# Patient Record
Sex: FEMALE | Race: WHITE | Employment: OTHER | URBAN - METROPOLITAN AREA
[De-identification: names, ages, dates, MRNs, and addresses within clinical notes are randomized per-mention and may not be internally consistent; named-entity substitution may affect disease eponyms.]

---

## 2018-03-26 PROBLEM — N89.8 VAGINAL WALL CYST: Status: ACTIVE | Noted: 2018-03-26

## 2019-04-03 PROBLEM — E66.01 SEVERE OBESITY (HCC): Status: ACTIVE | Noted: 2019-04-03

## 2019-11-14 PROBLEM — F33.0 DEPRESSION, MAJOR, RECURRENT, MILD (HCC): Status: ACTIVE | Noted: 2019-11-14

## 2021-04-27 ENCOUNTER — OFFICE VISIT (OUTPATIENT)
Dept: FAMILY MEDICINE CLINIC | Age: 41
End: 2021-04-27
Payer: MEDICAID

## 2021-04-27 VITALS
BODY MASS INDEX: 36.86 KG/M2 | TEMPERATURE: 99 F | OXYGEN SATURATION: 99 % | HEIGHT: 63 IN | DIASTOLIC BLOOD PRESSURE: 66 MMHG | RESPIRATION RATE: 18 BRPM | HEART RATE: 66 BPM | WEIGHT: 208 LBS | SYSTOLIC BLOOD PRESSURE: 120 MMHG

## 2021-04-27 DIAGNOSIS — Z11.59 NEED FOR HEPATITIS C SCREENING TEST: ICD-10-CM

## 2021-04-27 DIAGNOSIS — F41.9 ANXIETY AND DEPRESSION: Primary | ICD-10-CM

## 2021-04-27 DIAGNOSIS — E55.9 VITAMIN D DEFICIENCY: ICD-10-CM

## 2021-04-27 DIAGNOSIS — F32.A ANXIETY AND DEPRESSION: Primary | ICD-10-CM

## 2021-04-27 PROCEDURE — 99213 OFFICE O/P EST LOW 20 MIN: CPT | Performed by: PHYSICIAN ASSISTANT

## 2021-04-27 RX ORDER — SERTRALINE HYDROCHLORIDE 100 MG/1
100 TABLET, FILM COATED ORAL DAILY
COMMUNITY
End: 2022-05-03

## 2021-04-27 RX ORDER — DEXTROAMPHETAMINE SACCHARATE, AMPHETAMINE ASPARTATE, DEXTROAMPHETAMINE SULFATE AND AMPHETAMINE SULFATE 5; 5; 5; 5 MG/1; MG/1; MG/1; MG/1
20 TABLET ORAL 2 TIMES DAILY
COMMUNITY
End: 2022-05-03

## 2021-04-27 NOTE — ACP (ADVANCE CARE PLANNING)
Non-Provider Advance Care Planning (ACP) Note    Date of ACP Conversation: 4/27/2021  Persons included in Conversation: patient  Length of ACP Conversation in minutes: <16 minutes (Non-Billable)    Conversation requested by:   Provider    Authorized Decision Maker (if patient is incapable of making informed decisions):    This person is:  Named in Advance Directive or [address] [facility] [address] for ALL Patients with Decision Making Capacity:    Advance Directive Conversation with Patients who have not yet planned:  Importance of advance care planning, including choosing a healthcare agent to communicate patient's healthcare decisions if patient lost the ability to make decisions, such as after a sudden illness or accident    Review of Existing Advance Directive: (Select questions covered)  gave patient ACP paperwork    Interventions Provided:  gave patient ACP paperwork

## 2021-04-27 NOTE — PROGRESS NOTES
Sofia Knutson is a 41 y.o. female who presents to the office today with the following:  Chief Complaint   Patient presents with    Follow-up     with [facility]    Anxiety    Depression       HPI  Pt is now being treated by psychiatry. Is going to Dr. Maria Fernanda Zaman at [address] [place] [address] in [address] [place] [address]. Pt reports she has been doing well managing her anxiety/depression/ADHD. Did test positive for ADHD and is doing well on Adderall (though had to go to BID rather than one extended release daily for her symptoms). Also was put back on sertraline, was on in the past w/o much relief, but psychiatrist feels this combination may be helfpul (wherease she was not on the Adderall w/ it previously)  She does not report any concerning symptoms at this time. Is due for her Vit D recheck as well. Lab Results   Component Value Date/Time    VITAMIN D, 25-HYDROXY 23.5 (L) 01/15/2021 08:41 AM    has been taking 50,000IU weekly since last lab. Also unsure of Hep C screening, aside from when pregnant. No known hx. Agrees to lab. Otherwise feeling well with no other complaints or acute concerns. Plans to schedule her pap smear soon with her GYN. ROS  See HPI. Past Medical History:   Diagnosis Date    Abnormal Papanicolaou smear of cervix     ADD (attention deficit disorder)     Depression     Headache     MIGRAINES       Past Surgical History:   Procedure Laterality Date    HX ORTHOPAEDIC  2009, 2010, 2012, 2014    SHOULDER SURGERY X 4    HX TUBAL LIGATION  12/01/2016       Allergies   Allergen Reactions    Sulfa (Sulfonamide Antibiotics) Anaphylaxis    Clindamycin Rash     Pt was also on Medrol, but has had that before w/o issues. She notes was given other drugs in ER visit 1 week prior to rash, but had been on the Clindamycin all week leading up to it.     Other Medication Other (comments)     Pt has tried Lexapro, Sertraline, Effexor, Paxil, and Wellbutrin (anger/irritability) and either had unwanted SEs or did not work for anxiety/depression       Current Outpatient Medications   Medication Sig    sertraline (ZOLOFT) 100 mg tablet Take 100 mg by mouth daily.  dextroamphetamine-amphetamine (AdderalL) 20 mg tablet Take 20 mg by mouth two (2) times a day.  ergocalciferol (ERGOCALCIFEROL) 1,250 mcg (50,000 unit) capsule Take 1 Cap by mouth every seven (7) days.  ondansetron (ZOFRAN ODT) 4 mg disintegrating tablet DISSOLVE 1 TABLET ON TONGUE EVERY 8 HOURS AS NEEDED FOR NAUSEA    ibuprofen (MOTRIN) 800 mg tablet Take 1 Tab by mouth every eight (8) hours as needed for Pain.  butalbital-acetaminophen-caffeine (FIORICET, ESGIC) -40 mg per tablet TAKE 1 TABLET BY MOUTH EVERY 6 HOURS AS NEEDED FOR MIGRAINE    acetaminophen (Tylenol Extra Strength) 500 mg tablet Take 500 mg by mouth every six (6) hours as needed for Pain.  omeprazole (PRILOSEC) 40 mg capsule Take 40 mg by mouth daily.  montelukast (SINGULAIR) 10 mg tablet Take 10 mg by mouth daily.  cetirizine (ZYRTEC) 10 mg tablet Take  by mouth daily as needed for Allergies.  fluticasone propionate (FLONASE ALLERGY RELIEF) 50 mcg/actuation nasal spray 2 Sprays by Both Nostrils route two (2) times daily as needed for Rhinitis.  SUMAtriptan (IMITREX) 50 mg tablet May take up to 100 mg x 1 dose as needed for headache. If symptoms persist may repeat one time 2 hours later. Max 200 mg daily. No current facility-administered medications for this visit. Social History     Socioeconomic History    Marital status:      Spouse name: Not on file    Number of children: Not on file    Years of education: Not on file    Highest education level: Not on file   Tobacco Use    Smoking status: Former Smoker     Types: Cigarettes     Quit date: 02/2018     Years since quitting: 3.2    Smokeless tobacco: Never Used   Substance and Sexual Activity    Alcohol use:  Yes     Alcohol/week: 4.0 standard drinks     Types: 4 Standard drinks or equivalent per week     Comment: not really    Drug use: No    Sexual activity: Yes     Partners: Male     Birth control/protection: Surgical     Comment: BTL       Family History   Adopted: Yes         Physical Exam:  Visit Vitals  /66 (BP 1 Location: Left upper arm, BP Patient Position: Sitting, BP Cuff Size: Adult)   Pulse 66   Temp 99 °F (37.2 °C) (Oral)   Resp 18   Ht 5' 3\" (1.6 m)   Wt 208 lb (94.3 kg)   SpO2 99%   BMI 36.85 kg/m²     Physical Exam  Vitals signs and nursing note reviewed. Constitutional:       Appearance: Normal appearance. HENT:      Head: Normocephalic and atraumatic. Neck:      Musculoskeletal: Neck supple. Cardiovascular:      Rate and Rhythm: Normal rate and regular rhythm. Pulses: Normal pulses. Heart sounds: Normal heart sounds. Pulmonary:      Effort: Pulmonary effort is normal.      Breath sounds: Normal breath sounds. Musculoskeletal:         General: No swelling. Skin:     General: Skin is warm and dry. Neurological:      Mental Status: She is alert and oriented to person, place, and time. Psychiatric:         Mood and Affect: Mood normal.         Assessment/Plan:    ICD-10-CM ICD-9-CM    1. Anxiety and depression  F41.9 300.00     F32.9 311    2. Vitamin D deficiency  E55.9 268.9 VITAMIN D, 25 HYDROXY      VITAMIN D, 25 HYDROXY   3. Need for hepatitis C screening test  Z11.59 V73.89 HEPATITIS C AB      HEPATITIS C AB       will notify of labs. Routine f/u in Jan.  Seek care in interim for any new sxs or other concerns. Pt verbalizes understanding and agrees with the plan.     Ollis Babinski, PA-C

## 2021-04-28 LAB
25(OH)D3+25(OH)D2 SERPL-MCNC: 57.8 NG/ML (ref 30–100)
HCV AB S/CO SERPL IA: <0.1 S/CO RATIO (ref 0–0.9)

## 2021-12-10 ENCOUNTER — VIRTUAL VISIT (OUTPATIENT)
Dept: FAMILY MEDICINE CLINIC | Age: 41
End: 2021-12-10
Payer: MEDICAID

## 2021-12-10 DIAGNOSIS — J01.00 ACUTE NON-RECURRENT MAXILLARY SINUSITIS: Primary | ICD-10-CM

## 2021-12-10 PROCEDURE — 87426 SARSCOV CORONAVIRUS AG IA: CPT | Performed by: PHYSICIAN ASSISTANT

## 2021-12-10 PROCEDURE — 99213 OFFICE O/P EST LOW 20 MIN: CPT | Performed by: PHYSICIAN ASSISTANT

## 2021-12-10 RX ORDER — METHYLPHENIDATE HYDROCHLORIDE 36 MG/1
36 TABLET ORAL
COMMUNITY
End: 2022-05-03

## 2021-12-10 RX ORDER — AMOXICILLIN AND CLAVULANATE POTASSIUM 875; 125 MG/1; MG/1
1 TABLET, FILM COATED ORAL EVERY 12 HOURS
Qty: 14 TABLET | Refills: 0 | Status: SHIPPED | OUTPATIENT
Start: 2021-12-10 | End: 2021-12-17

## 2021-12-10 NOTE — PROGRESS NOTES
Lisa Michaels is a 41 y.o. female who was seen by synchronous (real-time) audio-video technology on 12/10/2021 for Nasal Congestion (doxy 2351010), Headache, and Cough      Assessment & Plan:     Diagnoses and all orders for this visit:    1. Acute non-recurrent maxillary sinusitis  -     amoxicillin-clavulanate (AUGMENTIN) 875-125 mg per tablet; Take 1 Tablet by mouth every twelve (12) hours for 7 days.  -     AMB POC SARS-COV-2      The complexity of medical decision making for this visit is moderate   Follow-up and Dispositions    · Return if symptoms worsen or fail to improve. discussed likely viral etiology. recommend pt hold off on abx treatment and treat empirically if symptoms persist over 10 days or if she gets worse in interim. Encourage rest & fluids. Humidifier, warm compresses, nasal spray, decongestants. Discussed otc medications for symptomatic relief. RTO/seek medical attn if sxs persist/worsen or develops any additional sxs/concerns. I spent at least 15 minutes on this visit with this established patient. Subjective: For about a week of nasal congestion, sinus pressure/pain, sneezing, coughing x 1 week. When able to bring up mucus with cough is either green or white. All green out of nose. Denies any fever/chills, body aches, n/v/d, sob, wheeze, cp, loss of taste/smell. Works at [employer]. Kids all with runny noses/congestion, but no known specific illness. She has not been vaccinated for COVID or flu but no known exposures. Pt otherwise with no other complaints or acute concerns. Prior to Admission medications    Medication Sig Start Date End Date Taking? Authorizing Provider   methylphenidate ER 36 mg 24 hr tab Take 36 mg by mouth every morning. Yes Provider, Historical   amoxicillin-clavulanate (AUGMENTIN) 875-125 mg per tablet Take 1 Tablet by mouth every twelve (12) hours for 7 days.  12/10/21 12/17/21 Yes Jin Barnes PA-C   sertraline (ZOLOFT) 100 mg tablet Take 100 mg by mouth daily. Yes Provider, Historical   ondansetron (ZOFRAN ODT) 4 mg disintegrating tablet DISSOLVE 1 TABLET ON TONGUE EVERY 8 HOURS AS NEEDED FOR NAUSEA 1/6/21  Yes Linda Pride PA-C   ibuprofen (MOTRIN) 800 mg tablet Take 1 Tab by mouth every eight (8) hours as needed for Pain. 11/16/20  Yes Linda Pride PA-C   butalbital-acetaminophen-caffeine (FIORICET, ESGIC) -40 mg per tablet TAKE 1 TABLET BY MOUTH EVERY 6 HOURS AS NEEDED FOR MIGRAINE 7/8/20  Yes Linda Pride PA-C   acetaminophen (Tylenol Extra Strength) 500 mg tablet Take 500 mg by mouth every six (6) hours as needed for Pain. Yes Provider, Historical   omeprazole (PRILOSEC) 40 mg capsule Take 40 mg by mouth daily. Yes Provider, Historical   montelukast (SINGULAIR) 10 mg tablet Take 10 mg by mouth daily. Yes Provider, Historical   cetirizine (ZYRTEC) 10 mg tablet Take  by mouth daily as needed for Allergies. Yes Provider, Historical   fluticasone propionate (FLONASE ALLERGY RELIEF) 50 mcg/actuation nasal spray 2 Sprays by Both Nostrils route two (2) times daily as needed for Rhinitis. Yes Provider, Historical   dextroamphetamine-amphetamine (AdderalL) 20 mg tablet Take 20 mg by mouth two (2) times a day. Provider, Historical   ergocalciferol (ERGOCALCIFEROL) 1,250 mcg (50,000 unit) capsule Take 1 Cap by mouth every seven (7) days. 1/22/21   Linda Pride PA-C   SUMAtriptan (IMITREX) 50 mg tablet May take up to 100 mg x 1 dose as needed for headache. If symptoms persist may repeat one time 2 hours later. Max 200 mg daily.   Patient not taking: Reported on 12/10/2021 1/6/21   Linda Pride PA-C     Patient Active Problem List   Diagnosis Code    Vaginal wall cyst N89.8    Severe obesity ([address] E66.01    Depression, major, recurrent, mild ([address] F33.0     Patient Active Problem List    Diagnosis Date Noted    Depression, major, recurrent, mild ([address] 11/14/2019    Severe obesity ([address] 04/03/2019  Vaginal wall cyst 03/26/2018     Current Outpatient Medications   Medication Sig Dispense Refill    methylphenidate ER 36 mg 24 hr tab Take 36 mg by mouth every morning.  amoxicillin-clavulanate (AUGMENTIN) 875-125 mg per tablet Take 1 Tablet by mouth every twelve (12) hours for 7 days. 14 Tablet 0    sertraline (ZOLOFT) 100 mg tablet Take 100 mg by mouth daily.  ondansetron (ZOFRAN ODT) 4 mg disintegrating tablet DISSOLVE 1 TABLET ON TONGUE EVERY 8 HOURS AS NEEDED FOR NAUSEA 30 Tab 0    ibuprofen (MOTRIN) 800 mg tablet Take 1 Tab by mouth every eight (8) hours as needed for Pain. 30 Tab 0    butalbital-acetaminophen-caffeine (FIORICET, ESGIC) -40 mg per tablet TAKE 1 TABLET BY MOUTH EVERY 6 HOURS AS NEEDED FOR MIGRAINE 10 Tab 0    acetaminophen (Tylenol Extra Strength) 500 mg tablet Take 500 mg by mouth every six (6) hours as needed for Pain.  omeprazole (PRILOSEC) 40 mg capsule Take 40 mg by mouth daily.  montelukast (SINGULAIR) 10 mg tablet Take 10 mg by mouth daily.  cetirizine (ZYRTEC) 10 mg tablet Take  by mouth daily as needed for Allergies.  fluticasone propionate (FLONASE ALLERGY RELIEF) 50 mcg/actuation nasal spray 2 Sprays by Both Nostrils route two (2) times daily as needed for Rhinitis.  dextroamphetamine-amphetamine (AdderalL) 20 mg tablet Take 20 mg by mouth two (2) times a day.  ergocalciferol (ERGOCALCIFEROL) 1,250 mcg (50,000 unit) capsule Take 1 Cap by mouth every seven (7) days. 12 Cap 1    SUMAtriptan (IMITREX) 50 mg tablet May take up to 100 mg x 1 dose as needed for headache. If symptoms persist may repeat one time 2 hours later. Max 200 mg daily. (Patient not taking: Reported on 12/10/2021) 8 Tab 0     Allergies   Allergen Reactions    Sulfa (Sulfonamide Antibiotics) Anaphylaxis    Clindamycin Rash     Pt was also on Medrol, but has had that before w/o issues.  She notes was given other drugs in ER visit 1 week prior to rash, but had been on the Clindamycin all week leading up to it.  Other Medication Other (comments)     Pt has tried Lexapro, Sertraline, Effexor, Paxil, and Wellbutrin (anger/irritability) and either had unwanted SEs or did not work for anxiety/depression     Past Medical History:   Diagnosis Date    Abnormal Papanicolaou smear of cervix     ADD (attention deficit disorder)     Depression     Headache     MIGRAINES     Past Surgical History:   Procedure Laterality Date    HX ORTHOPAEDIC  2009, 2010, 2012, 2014    SHOULDER SURGERY X 4    HX TUBAL LIGATION  12/01/2016     Family History   Adopted: Yes     Social History     Tobacco Use    Smoking status: Former Smoker     Types: Cigarettes     Quit date: 02/2018     Years since quitting: 3.8    Smokeless tobacco: Never Used   Substance Use Topics    Alcohol use: Yes     Alcohol/week: 4.0 standard drinks     Types: 4 Standard drinks or equivalent per week     Comment: not really       ROS    Objective:     Patient-Reported Vitals 12/10/2021   Patient-Reported Weight 221   Patient-Reported Height 53        [INSTRUCTIONS:  \"[x]\" Indicates a positive item  \"[]\" Indicates a negative item  -- DELETE ALL ITEMS NOT EXAMINED]    Constitutional: [x] Appears well-developed and well-nourished [x] No apparent distress      [] Abnormal -     Mental status: [x] Alert and awake  [x] Oriented to person/place/time [x] Able to follow commands    [] Abnormal -     Eyes:   EOM    [x]  Normal    [] Abnormal -   Sclera  [x]  Normal    [] Abnormal -          Discharge [x]  None visible   [] Abnormal -     HENT: [x] Normocephalic, atraumatic  [] Abnormal -   [x] Mouth/Throat: Mucous membranes are moist  Some discomfort when palpates beside nose, only mild in forehead. No visible erythema/swelling.     External Ears [x] Normal  [] Abnormal -    Neck: [x] No visualized mass [] Abnormal -     Pulmonary/Chest: [x] Respiratory effort normal   [x] No visualized signs of difficulty breathing or respiratory distress        [] Abnormal -      Musculoskeletal:   [] Normal gait with no signs of ataxia         [x] Normal range of motion of neck        [] Abnormal -     Neurological:        [x] No Facial Asymmetry (Cranial nerve 7 motor function) (limited exam due to video visit)          [x] No gaze palsy        [] Abnormal -          Skin:        [x] No significant exanthematous lesions or discoloration noted on facial skin         [] Abnormal -            Psychiatric:       [x] Normal Affect [] Abnormal -        [x] No Hallucinations    Other pertinent observable physical exam findings:-        We discussed the expected course, resolution and complications of the diagnosis(es) in detail. Medication risks, benefits, costs, interactions, and alternatives were discussed as indicated. I advised her to contact the office if her condition worsens, changes or fails to improve as anticipated. She expressed understanding with the diagnosis(es) and plan. Reece Leigh, was evaluated through a synchronous (real-time) audio-video encounter. The patient (or guardian if applicable) is aware that this is a billable service. Verbal consent to proceed has been obtained within the past 12 months. The visit was conducted pursuant to the emergency declaration under the [facility], [address] [place] [address] authority and the [employer] and [employer] General Act. Patient identification was verified, and a caregiver was present when appropriate. The patient was located in a state where the provider was credentialed to provide care.       Christo Narvaez PA-C

## 2021-12-13 LAB — SARS-COV-2 POC: NEGATIVE

## 2022-03-19 PROBLEM — E66.01 SEVERE OBESITY (HCC): Status: ACTIVE | Noted: 2019-04-03

## 2022-03-19 PROBLEM — N89.8 VAGINAL WALL CYST: Status: ACTIVE | Noted: 2018-03-26

## 2022-03-19 PROBLEM — F33.0 DEPRESSION, MAJOR, RECURRENT, MILD (HCC): Status: ACTIVE | Noted: 2019-11-14

## 2022-04-27 NOTE — PROGRESS NOTES
Patricia Duenas (: 1980) is a 35 y.o. female, patient, here for evaluation of the following chief complaint(s):  Shoulder Pain (left)       HPI:    She began having increased left shoulder pain in 2018. The patient states that she was injured at work. 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 She describes her left shoulder pain as severe to extremely severe, sharp, stabbing, and constant. She has been experiencing some numbness and weakness in her left shoulder and upper extremity. The patient states that her pain continues to get worse. She reports that lifting, exercise, twisting, and lying in bed make her pain worse and nothing seems to make her pain better. She has tried NSAIDs and topical creams. She has tried previous injections and formal therapy. The patient was not seen in the emergency room for left shoulder pain. She has had previous x-rays and an MRI performed on her left shoulder at outside facilities. The patient does report several previous surgeries. Of note, the patient is here seeking a second opinion. Allergies   Allergen Reactions    Sulfa (Sulfonamide Antibiotics) Anaphylaxis    Clindamycin Rash     Pt was also on Medrol, but has had that before w/o issues. She notes was given other drugs in ER visit 1 week prior to rash, but had been on the Clindamycin all week leading up to it.  Other Medication Other (comments)     Pt has tried Lexapro, Sertraline, Effexor, Paxil, and Wellbutrin (anger/irritability) and either had unwanted SEs or did not work for anxiety/depression       Current Outpatient Medications   Medication Sig    methylphenidate ER 36 mg 24 hr tab Take 36 mg by mouth every morning.  sertraline (ZOLOFT) 100 mg tablet Take 100 mg by mouth daily.  dextroamphetamine-amphetamine (AdderalL) 20 mg tablet Take 20 mg by mouth two (2) times a day.     ergocalciferol (ERGOCALCIFEROL) 1,250 mcg (50,000 unit) capsule Take 1 Cap by mouth every seven (7) days.  SUMAtriptan (IMITREX) 50 mg tablet May take up to 100 mg x 1 dose as needed for headache. If symptoms persist may repeat one time 2 hours later. Max 200 mg daily. (Patient not taking: Reported on 12/10/2021)    ondansetron (ZOFRAN ODT) 4 mg disintegrating tablet DISSOLVE 1 TABLET ON TONGUE EVERY 8 HOURS AS NEEDED FOR NAUSEA    ibuprofen (MOTRIN) 800 mg tablet Take 1 Tab by mouth every eight (8) hours as needed for Pain.  butalbital-acetaminophen-caffeine (FIORICET, ESGIC) -40 mg per tablet TAKE 1 TABLET BY MOUTH EVERY 6 HOURS AS NEEDED FOR MIGRAINE    acetaminophen (Tylenol Extra Strength) 500 mg tablet Take 500 mg by mouth every six (6) hours as needed for Pain.  omeprazole (PRILOSEC) 40 mg capsule Take 40 mg by mouth daily.  montelukast (SINGULAIR) 10 mg tablet Take 10 mg by mouth daily.  cetirizine (ZYRTEC) 10 mg tablet Take  by mouth daily as needed for Allergies.  fluticasone propionate (FLONASE ALLERGY RELIEF) 50 mcg/actuation nasal spray 2 Sprays by Both Nostrils route two (2) times daily as needed for Rhinitis. No current facility-administered medications for this visit.        Past Medical History:   Diagnosis Date    Abnormal Papanicolaou smear of cervix     ADD (attention deficit disorder)     Depression     Headache     MIGRAINES        Past Surgical History:   Procedure Laterality Date    HX ORTHOPAEDIC  , , ,     SHOULDER SURGERY X 4    HX TUBAL LIGATION  2016       Family History   Adopted: Yes        Social History     Socioeconomic History    Marital status:      Spouse name: Not on file    Number of children: Not on file    Years of education: Not on file    Highest education level: Not on file   Occupational History    Not on file   Tobacco Use    Smoking status: Former Smoker     Types: Cigarettes     Quit date: 2018     Years since quittin.2    Smokeless tobacco: Never Used   Substance and Sexual Activity    Alcohol use: Yes     Alcohol/week: 4.0 standard drinks     Types: 4 Standard drinks or equivalent per week     Comment: not really    Drug use: No    Sexual activity: Yes     Partners: Male     Birth control/protection: Surgical     Comment: BTL   Other Topics Concern    Not on file   Social History Narrative    Not on file     Social Determinants of Health     Financial Resource Strain:     Difficulty of Paying Living Expenses: Not on file   Food Insecurity:     Worried About Running Out of Food in the Last Year: Not on file    Janelle of Food in the Last Year: Not on file   Transportation Needs:     Lack of Transportation (Medical): Not on file    Lack of Transportation (Non-Medical): Not on file   Physical Activity:     Days of Exercise per Week: Not on file    Minutes of Exercise per Session: Not on file   Stress:     Feeling of Stress : Not on file   Social Connections:     Frequency of Communication with Friends and Family: Not on file    Frequency of Social Gatherings with Friends and Family: Not on file    Attends [employer] Services: Not on file    Active Member of [address] [place] [address] or Organizations: Not on file    Attends Club or Organization Meetings: Not on file    Marital Status: Not on file   Intimate Partner Violence:     Fear of Current or Ex-Partner: Not on file    Emotionally Abused: Not on file    Physically Abused: Not on file    Sexually Abused: Not on file   Housing Stability:     Unable to Pay for Housing in the Last Year: Not on file    Number of [place] in the Last Year: Not on file    Unstable Housing in the Last Year: Not on file       Review of Systems   All other systems reviewed and are negative. Vitals:  Ht 5' 2\" (1.575 m)   Wt 210 lb (95.3 kg)   BMI 38.41 kg/m²    Body mass index is 38.41 kg/m². Ortho Exam     The patient is well-developed and well-nourished.   The patient presents today in alert and oriented x3 with a normal mood and affect. The patient stands with a normal weightbearing line and walks with a normal gait. Left shoulder previous surgical incisions are well-healed with no sign of irritation or infection and look normal.  The patient sits with normal posture. They are mildly tender to palpation over the proximal biceps and posterior joint line. The patient has diffusely decreased range of motion with significant discomfort with above shoulder range of motion and at the extremes of her limited. The patient has discomfort with Zapata´s test and SLAP testing. The shoulder is stable on exam. They have 5/5 strength, and are neurovascularly intact distally. There is no erythema, warmth or skin lesions present. ASSESSMENT/PLAN:      1. Pain of left shoulder region  2. Chronic left shoulder pain  -     MRI SHOULDER LT WO CONT; Future  3. Superior glenoid labrum lesion of left shoulder, initial encounter  -     MRI SHOULDER LT WO CONT; Future  4. Arthritis of left shoulder region       Below is the assessment and plan developed based on review of pertinent history, physical exam, labs, studies, and medications. We discussed the patient's left shoulder pain and her signs, symptoms, physical exam, description of her pain, description of her work-related injury, and x-rays are consistent with a labral tear and early stage arthritis.   The possible treatment options were discussed with the patient and because of the several year long duration of her increased pain, no improvement with multiple modalities of conservative management including an at-home exercise program, formal physical therapy, past injections, and numerous surgical interventions, her physical exam, description of her pain, description of her work-related injury, outside x-rays, previous MRI images and results, and her inability to complete daily living activities without significant discomfort we elected to obtain an updated and new MRI of her left shoulder to further evaluate the severity of her labral tear and osteoarthritis. The MRI images and results will be used in preoperative planning if and almost certainly when additional surgical intervention is necessary. The risks and benefits of the MRI were discussed in detail with the patient and she would like to proceed. We will schedule this at her convenience. I will see her back after her MRI is complete to discuss the images, results, and further treatment options. In the interim, I did encourage her to ice when possible, modify her activity level based on her left shoulder pain, and use anti-inflammatory medication when necessary. The patient will work on range of motion, strengthening, and stretching exercises with an at-home exercise program as pain tolerates. I will see her back as noted above after left shoulder MRI is complete. We will obtain an MRI for more information to determine the best treatment plan moving forward and help us prepare for surgical intervention if necessary. Return in about 2 weeks (around 5/12/2022) for After her left shoulder MRI is complete. An electronic signature was used to authenticate this note.   -- Prashanth David MD

## 2022-04-28 ENCOUNTER — OFFICE VISIT (OUTPATIENT)
Dept: ORTHOPEDIC SURGERY | Age: 42
End: 2022-04-28
Payer: MEDICAID

## 2022-04-28 VITALS — BODY MASS INDEX: 38.64 KG/M2 | HEIGHT: 62 IN | WEIGHT: 210 LBS

## 2022-04-28 DIAGNOSIS — M19.012 ARTHRITIS OF LEFT SHOULDER REGION: ICD-10-CM

## 2022-04-28 DIAGNOSIS — M25.512 CHRONIC LEFT SHOULDER PAIN: ICD-10-CM

## 2022-04-28 DIAGNOSIS — M25.512 PAIN OF LEFT SHOULDER REGION: Primary | ICD-10-CM

## 2022-04-28 DIAGNOSIS — G89.29 CHRONIC LEFT SHOULDER PAIN: ICD-10-CM

## 2022-04-28 DIAGNOSIS — S43.432A SUPERIOR GLENOID LABRUM LESION OF LEFT SHOULDER, INITIAL ENCOUNTER: ICD-10-CM

## 2022-04-28 PROCEDURE — 99203 OFFICE O/P NEW LOW 30 MIN: CPT | Performed by: ORTHOPAEDIC SURGERY

## 2022-05-03 ENCOUNTER — OFFICE VISIT (OUTPATIENT)
Dept: ORTHOPEDIC SURGERY | Age: 42
End: 2022-05-03
Payer: MEDICAID

## 2022-05-03 VITALS — HEIGHT: 63 IN | BODY MASS INDEX: 37.21 KG/M2 | WEIGHT: 210 LBS

## 2022-05-03 DIAGNOSIS — M25.512 PAIN OF LEFT SHOULDER REGION: ICD-10-CM

## 2022-05-03 DIAGNOSIS — G89.29 CHRONIC LEFT SHOULDER PAIN: ICD-10-CM

## 2022-05-03 DIAGNOSIS — M25.512 CHRONIC LEFT SHOULDER PAIN: ICD-10-CM

## 2022-05-03 DIAGNOSIS — M54.12 CERVICAL RADICULOPATHY: Primary | ICD-10-CM

## 2022-05-03 DIAGNOSIS — M25.612 SHOULDER STIFFNESS, LEFT: ICD-10-CM

## 2022-05-03 DIAGNOSIS — M19.012 ARTHRITIS OF LEFT SHOULDER REGION: ICD-10-CM

## 2022-05-03 PROCEDURE — 99214 OFFICE O/P EST MOD 30 MIN: CPT | Performed by: ORTHOPAEDIC SURGERY

## 2022-05-03 NOTE — PROGRESS NOTES
Patricia Duenas (: 1980) is a 35 y.o. female, patient, here for evaluation of the following chief complaint(s):  Shoulder Pain (left (MRI results))       HPI:    She was last seen for her left shoulder pain on 2022. Since then, the patient did have an MRI performed on her left shoulder on the morning of 5/3/2022 before her afternoon appointment. The patient states that her pain level is the same as was her last visit. She rates the severity of her left shoulder pain is a 9 out of 10. She describes her pain as sharp, stabbing, burning, and constant. Her left shoulder pain does make it very difficult for her to go to sleep and does wake her up from sleep. The patient reports taking no medication for discomfort. Left shoulder MRI images and results were independently reviewed and they were consistent with extensive red marrow reconversion which can be seen in conditions necessitating increased hematopoiesis. This can be seen in overweight females, particularly smokers, but appears more extensive than usually seen. Recommend clinical correlation. The labrum is small in size with multiple anchors in the glenoid likely due to prior surgery. The labrum has a scalloped appearance posteriorly which may be related to postsurgical change. Allergies   Allergen Reactions    Sulfa (Sulfonamide Antibiotics) Anaphylaxis    Clindamycin Rash     Pt was also on Medrol, but has had that before w/o issues. She notes was given other drugs in ER visit 1 week prior to rash, but had been on the Clindamycin all week leading up to it.  Other Medication Other (comments)     Pt has tried Lexapro, Sertraline, Effexor, Paxil, and Wellbutrin (anger/irritability) and either had unwanted SEs or did not work for anxiety/depression       Current Outpatient Medications   Medication Sig    SUMAtriptan (IMITREX) 50 mg tablet May take up to 100 mg x 1 dose as needed for headache.  If symptoms persist may repeat one time 2 hours later. Max 200 mg daily. (Patient not taking: Reported on 12/10/2021)    ondansetron (ZOFRAN ODT) 4 mg disintegrating tablet DISSOLVE 1 TABLET ON TONGUE EVERY 8 HOURS AS NEEDED FOR NAUSEA    ibuprofen (MOTRIN) 800 mg tablet Take 1 Tab by mouth every eight (8) hours as needed for Pain.  butalbital-acetaminophen-caffeine (FIORICET, ESGIC) -40 mg per tablet TAKE 1 TABLET BY MOUTH EVERY 6 HOURS AS NEEDED FOR MIGRAINE    acetaminophen (Tylenol Extra Strength) 500 mg tablet Take 500 mg by mouth every six (6) hours as needed for Pain.  omeprazole (PRILOSEC) 40 mg capsule Take 40 mg by mouth daily.  montelukast (SINGULAIR) 10 mg tablet Take 10 mg by mouth daily.  cetirizine (ZYRTEC) 10 mg tablet Take  by mouth daily as needed for Allergies.  fluticasone propionate (FLONASE ALLERGY RELIEF) 50 mcg/actuation nasal spray 2 Sprays by Both Nostrils route two (2) times daily as needed for Rhinitis. No current facility-administered medications for this visit. Past Medical History:   Diagnosis Date    Abnormal Papanicolaou smear of cervix     ADD (attention deficit disorder)     Depression     Headache     MIGRAINES        Past Surgical History:   Procedure Laterality Date    HX ORTHOPAEDIC  , , , 2014    SHOULDER SURGERY X 4    HX TUBAL LIGATION  2016       Family History   Adopted: Yes        Social History     Socioeconomic History    Marital status:      Spouse name: Not on file    Number of children: Not on file    Years of education: Not on file    Highest education level: Not on file   Occupational History    Not on file   Tobacco Use    Smoking status: Former Smoker     Types: Cigarettes     Quit date: 2018     Years since quittin.2    Smokeless tobacco: Never Used   Substance and Sexual Activity    Alcohol use:  Yes     Alcohol/week: 4.0 standard drinks     Types: 4 Standard drinks or equivalent per week     Comment: not really    Drug use: No    Sexual activity: Yes     Partners: Male     Birth control/protection: Surgical     Comment: BTL   Other Topics Concern    Not on file   Social History Narrative    Not on file     Social Determinants of Health     Financial Resource Strain:     Difficulty of Paying Living Expenses: Not on file   Food Insecurity:     Worried About Running Out of Food in the Last Year: Not on file    Janelle of Food in the Last Year: Not on file   Transportation Needs:     Lack of Transportation (Medical): Not on file    Lack of Transportation (Non-Medical): Not on file   Physical Activity:     Days of Exercise per Week: Not on file    Minutes of Exercise per Session: Not on file   Stress:     Feeling of Stress : Not on file   Social Connections:     Frequency of Communication with Friends and Family: Not on file    Frequency of Social Gatherings with Friends and Family: Not on file    Attends [employer] Services: Not on file    Active Member of [address] [place] [address] or Organizations: Not on file    Attends Club or Organization Meetings: Not on file    Marital Status: Not on file   Intimate Partner Violence:     Fear of Current or Ex-Partner: Not on file    Emotionally Abused: Not on file    Physically Abused: Not on file    Sexually Abused: Not on file   Housing Stability:     Unable to Pay for Housing in the Last Year: Not on file    Number of [place] in the Last Year: Not on file    Unstable Housing in the Last Year: Not on file       Review of Systems   All other systems reviewed and are negative. Vitals:  Ht 5' 3\" (1.6 m)   Wt 210 lb (95.3 kg)   BMI 37.20 kg/m²    Body mass index is 37.2 kg/m². Ortho Exam     The patient is well-developed and well-nourished. The patient presents today in alert and oriented x3 with a normal mood and affect.   The patient stands with a normal weightbearing line and walks with a normal gait.     Left shoulder previous surgical incisions are well-healed with no sign of irritation or infection and look normal.  The patient sits with normal posture. They are mildly tender to palpation over the proximal biceps and posterior joint line. The patient has diffusely decreased range of motion with significant discomfort with above shoulder range of motion and at the extremes of her limited. The patient has discomfort with Zapata´s test and SLAP testing. The shoulder is stable on exam. They have 5/5 strength, and are neurovascularly intact distally. There is no erythema, warmth or skin lesions present. ASSESSMENT/PLAN:      1. Cervical radiculopathy  -     XR SPINE CERV PA LAT ODONT 3 V MAX; Future  2. Chronic left shoulder pain  -     REFERRAL TO ORTHOPEDIC SURGERY  3. Pain of left shoulder region  4. Arthritis of left shoulder region  5. Shoulder stiffness, left  -     REFERRAL TO ORTHOPEDIC SURGERY     XR Results (most recent):  Results from Appointment encounter on 05/03/22    XR SPINE CERV PA LAT ODONT 3 V MAX    Narrative  Cervical spine 2 view x-ray show no evidence of a fracture or dislocation. Disc spaces well-maintained. Lordotic curve is well-maintained. Below is the assessment and plan developed based on review of pertinent history, physical exam, labs, studies, and medications. We discussed the patient's ongoing left shoulder pain and independently reviewed her MRI images and results and they were consistent with extensive red marrow reconversion which can be seen in conditions necessitating increased hematopoiesis. This can be seen in overweight females, particularly smokers, but appears more extensive than usually seen. Recommend clinical correlation. The labrum is small in size with multiple anchors in the glenoid likely due to prior surgery. The labrum has a scalloped appearance posteriorly which may be related to postsurgical change.   The possible treatment options were discussed with the patient and because of the duration of her increased pain, no improvement with multiple modalities of conservative management including an at-home exercise program, her physical exam and extremely limited range of motion, description of her pain, past surgical history, past x-rays, MRI images and results that were independently reviewed, and her inability to complete daily living activities without significant discomfort we both decided that surgical intervention was the best treatment plan. The risks and benefits of a left shoulder arthroscopic exam with extensive debridement and capsular release were discussed in detail with the patient and she would like to proceed. We will schedule this at her convenience. In the interim, I did encourage her to ice when possible, modify her activity level based on her left shoulder pain, and use anti-inflammatory as needed. She will work on range of motion, strengthening, and stretching exercises with an at-home exercise program as pain tolerates. We did obtain x-rays of her cervical spine today to rule out any of her shoulder pain being secondary to her cervical spine. Her cervical spine x-rays look normal with well-maintained disc space and a well-maintained lordotic curve. I will see her back in the office on an as-needed basis or on the day of her left shoulder surgery. Return for In the office as needed or on the day of her left shoulder surgery. An electronic signature was used to authenticate this note.   -- Nils Reagan MD

## 2022-06-06 DIAGNOSIS — M19.012 ARTHRITIS OF LEFT SHOULDER REGION: Primary | ICD-10-CM

## 2022-06-06 DIAGNOSIS — S43.432A SUPERIOR GLENOID LABRUM LESION OF LEFT SHOULDER, INITIAL ENCOUNTER: ICD-10-CM

## 2022-06-06 DIAGNOSIS — M25.512 PAIN OF LEFT SHOULDER REGION: ICD-10-CM

## 2022-06-06 RX ORDER — OXYCODONE AND ACETAMINOPHEN 5; 325 MG/1; MG/1
1 TABLET ORAL
Qty: 40 TABLET | Refills: 0 | Status: SHIPPED | OUTPATIENT
Start: 2022-06-06 | End: 2022-06-13

## 2022-06-14 ENCOUNTER — OFFICE VISIT (OUTPATIENT)
Dept: ORTHOPEDIC SURGERY | Age: 42
End: 2022-06-14
Payer: MEDICAID

## 2022-06-14 VITALS — WEIGHT: 207 LBS | BODY MASS INDEX: 36.68 KG/M2 | HEIGHT: 63 IN

## 2022-06-14 DIAGNOSIS — M25.512 PAIN OF LEFT SHOULDER REGION: Primary | ICD-10-CM

## 2022-06-14 DIAGNOSIS — Z98.890 STATUS POST SHOULDER SURGERY: ICD-10-CM

## 2022-06-14 DIAGNOSIS — M19.012 ARTHRITIS OF LEFT SHOULDER REGION: Primary | ICD-10-CM

## 2022-06-14 DIAGNOSIS — S43.432A SUPERIOR GLENOID LABRUM LESION OF LEFT SHOULDER, INITIAL ENCOUNTER: ICD-10-CM

## 2022-06-14 PROCEDURE — 99024 POSTOP FOLLOW-UP VISIT: CPT | Performed by: ORTHOPAEDIC SURGERY

## 2022-06-14 RX ORDER — HYDROCODONE BITARTRATE AND ACETAMINOPHEN 10; 325 MG/1; MG/1
1 TABLET ORAL
Qty: 30 TABLET | Refills: 0 | Status: SHIPPED | OUTPATIENT
Start: 2022-06-14 | End: 2022-06-22

## 2022-06-14 RX ORDER — MELOXICAM 15 MG/1
15 TABLET ORAL DAILY
Qty: 30 TABLET | Refills: 1 | Status: SHIPPED | OUTPATIENT
Start: 2022-06-14

## 2022-06-14 RX ORDER — MELOXICAM 15 MG/1
15 TABLET ORAL DAILY
Qty: 30 TABLET | Refills: 1 | Status: CANCELLED | OUTPATIENT
Start: 2022-06-14

## 2022-06-14 NOTE — PROGRESS NOTES
Patricia Duenas (: 1980) is a 35 y.o. female, patient, here for evaluation of the following chief complaint(s):  Surgical Follow-up and Shoulder Pain (left sx on 22)       HPI:    She is now 7 days status post left shoulder arthroscopic exam with extensive debridement and open biceps tenodesis. Her surgery was performed on 2022. The patient states that her pain level has improved slightly since her surgical procedure. She rates the severity of her postoperative left shoulder pain as a 9 out of 10. She describes her postoperative left shoulder pain as sharp, stabbing, burning, and constant. Her postoperative left shoulder pain does make it difficult for her to go to sleep and does wake her up from sleep. The patient has been taking Aleve and oxycodone for her discomfort as needed. She has been wearing a sling for assistance. Allergies   Allergen Reactions    Sulfa (Sulfonamide Antibiotics) Anaphylaxis    Clindamycin Rash     Pt was also on Medrol, but has had that before w/o issues. She notes was given other drugs in ER visit 1 week prior to rash, but had been on the Clindamycin all week leading up to it.  Other Medication Other (comments)     Pt has tried Lexapro, Sertraline, Effexor, Paxil, and Wellbutrin (anger/irritability) and either had unwanted SEs or did not work for anxiety/depression       Current Outpatient Medications   Medication Sig    meloxicam (Mobic) 15 mg tablet Take 1 Tablet by mouth daily.  SUMAtriptan (IMITREX) 50 mg tablet May take up to 100 mg x 1 dose as needed for headache. If symptoms persist may repeat one time 2 hours later. Max 200 mg daily. (Patient not taking: Reported on 12/10/2021)    ondansetron (ZOFRAN ODT) 4 mg disintegrating tablet DISSOLVE 1 TABLET ON TONGUE EVERY 8 HOURS AS NEEDED FOR NAUSEA    ibuprofen (MOTRIN) 800 mg tablet Take 1 Tab by mouth every eight (8) hours as needed for Pain.     butalbital-acetaminophen-caffeine (FIORICET, ESGIC) -40 mg per tablet TAKE 1 TABLET BY MOUTH EVERY 6 HOURS AS NEEDED FOR MIGRAINE    acetaminophen (Tylenol Extra Strength) 500 mg tablet Take 500 mg by mouth every six (6) hours as needed for Pain.  omeprazole (PRILOSEC) 40 mg capsule Take 40 mg by mouth daily.  montelukast (SINGULAIR) 10 mg tablet Take 10 mg by mouth daily.  cetirizine (ZYRTEC) 10 mg tablet Take  by mouth daily as needed for Allergies.  fluticasone propionate (FLONASE ALLERGY RELIEF) 50 mcg/actuation nasal spray 2 Sprays by Both Nostrils route two (2) times daily as needed for Rhinitis. No current facility-administered medications for this visit. Past Medical History:   Diagnosis Date    Abnormal Papanicolaou smear of cervix     ADD (attention deficit disorder)     Depression     Headache     MIGRAINES        Past Surgical History:   Procedure Laterality Date    HX ORTHOPAEDIC  , , ,     SHOULDER SURGERY X 4    HX TUBAL LIGATION  2016       Family History   Adopted: Yes        Social History     Socioeconomic History    Marital status:      Spouse name: Not on file    Number of children: Not on file    Years of education: Not on file    Highest education level: Not on file   Occupational History    Not on file   Tobacco Use    Smoking status: Former Smoker     Types: Cigarettes     Quit date: 2018     Years since quittin.3    Smokeless tobacco: Never Used   Substance and Sexual Activity    Alcohol use:  Yes     Alcohol/week: 4.0 standard drinks     Types: 4 Standard drinks or equivalent per week     Comment: not really    Drug use: No    Sexual activity: Yes     Partners: Male     Birth control/protection: Surgical     Comment: BTL   Other Topics Concern    Not on file   Social History Narrative    Not on file     Social Determinants of Health     Financial Resource Strain:     Difficulty of Paying Living Expenses: Not on file   Food Insecurity:     Worried About Running Out of Food in the Last Year: Not on file    Ran Out of Food in the Last Year: Not on file   Transportation Needs:     Lack of Transportation (Medical): Not on file    Lack of Transportation (Non-Medical): Not on file   Physical Activity:     Days of Exercise per Week: Not on file    Minutes of Exercise per Session: Not on file   Stress:     Feeling of Stress : Not on file   Social Connections:     Frequency of Communication with Friends and Family: Not on file    Frequency of Social Gatherings with Friends and Family: Not on file    Attends Tenriism Services: Not on file    Active Member of [address] [place] [address] [employer] or Organizations: Not on file    Attends Club or Organization Meetings: Not on file    Marital Status: Not on file   Intimate Partner Violence:     Fear of Current or Ex-Partner: Not on file    Emotionally Abused: Not on file    Physically Abused: Not on file    Sexually Abused: Not on file   Housing Stability:     Unable to Pay for Housing in the Last Year: Not on file    Number of [place] in the Last Year: Not on file    Unstable Housing in the Last Year: Not on file       Review of Systems   All other systems reviewed and are negative. Vitals:  Ht 5' 3\" (1.6 m)   Wt 207 lb (93.9 kg)   BMI 36.67 kg/m²    Body mass index is 36.67 kg/m². Ortho Exam     The patient is well-developed and well-nourished. The patient presents today in alert and oriented x3 with a normal mood and affect. The patient stands with a normal weightbearing line and walks with a normal gait. Left shoulder wounds are clean and dry neurovascular intact. There is some ecchymosis but no erythema. Her stitches were removed and her incisions are healing nicely with no sign of irritation or infection and look normal.  Her biceps tenodesis incision is healing nicely as well. She does have good early elbow and wrist range of motion. Her range of motion was not tested today.   Her strength was not tested today.  Her sensations are intact and pulses are 2+. Her skin is healing nicely. ASSESSMENT/PLAN:      1. Pain of left shoulder region  -     meloxicam (Mobic) 15 mg tablet; Take 1 Tablet by mouth daily. , Normal, Disp-30 Tablet, R-1  2. Status post shoulder surgery  -     meloxicam (Mobic) 15 mg tablet; Take 1 Tablet by mouth daily. , Normal, Disp-30 Tablet, R-1       Below is the assessment and plan developed based on review of pertinent history, physical exam, labs, studies, and medications. We discussed the patient's recent left shoulder arthroscopic exam with extensive debridement and open biceps tenodesis. Her surgery was performed on 6/6/2022. The patient is progressing nicely in the early stages of her recovery. She is having the appropriate amount of expected postoperative discomfort at this time. We did remove the patient stitches today in the office without complication. Her incisions are healing nicely with no sign of irritation or infection and look normal.  She does have good early elbow and wrist range of motion. Her shoulder range of motion was not tested today. Her strength was not tested today. The patient will continue the postoperative protocol by wearing a sling for assistance as needed. She may start working on range of motion, strengthening, and stretching exercises with an at-home exercise program as pain tolerates. She is to avoid any significant lifting with her left upper extremity, power biceps activities, internal and external supination, and reaching behind her with her arm fully extended. The patient will continue to ice when possible, modify her activity level based on her postoperative left shoulder and arm pain, and use anti-inflammatory medication when necessary. She was given a prescription for meloxicam which she will use as needed and as directed.   I will see her back in 2 to 3 weeks for reevaluation and further discussion of the postoperative protocol. Return in about 3 weeks (around 7/5/2022) for Re-Evaluation and further discussion of the postop protocol. An electronic signature was used to authenticate this note.   -- Jim Call MD

## 2022-06-14 NOTE — LETTER
6/14/2022 11:27 AM    [address] [phone]    To whom to may concern:     Blayne Osman , was seen in our office on 6/14/2022. She  may return to work with the following restrictions until after seen in office at her next appointment:      - No lifting left arm    Please feel free to contact my office at [phone] if you have any questions or concerns.         Sincerely,              Unknown MD Yuli

## 2022-07-07 ENCOUNTER — OFFICE VISIT (OUTPATIENT)
Dept: ORTHOPEDIC SURGERY | Age: 42
End: 2022-07-07
Payer: MEDICAID

## 2022-07-07 VITALS — HEIGHT: 63 IN | BODY MASS INDEX: 36.68 KG/M2 | WEIGHT: 207 LBS

## 2022-07-07 DIAGNOSIS — Z98.890 STATUS POST SHOULDER SURGERY: ICD-10-CM

## 2022-07-07 DIAGNOSIS — M25.512 PAIN OF LEFT SHOULDER REGION: Primary | ICD-10-CM

## 2022-07-07 PROCEDURE — 99024 POSTOP FOLLOW-UP VISIT: CPT | Performed by: ORTHOPAEDIC SURGERY

## 2022-07-07 RX ORDER — IBUPROFEN 800 MG/1
800 TABLET ORAL
Qty: 90 TABLET | Refills: 2 | Status: SHIPPED | OUTPATIENT
Start: 2022-07-07 | End: 2022-08-11 | Stop reason: SDUPTHER

## 2022-07-07 NOTE — PROGRESS NOTES
Eri Washington (: 1980) is a 35 y.o. female, patient, here for evaluation of the following chief complaint(s):  Shoulder Pain (left) and Surgical Follow-up (sx 22)       HPI:    She is now approximately 4 1/2 weeks status post left shoulder arthroscopic exam with extensive debridement and open biceps tenodesis. Her surgery was performed on 2022. She was last seen on 2022. The patient states that her pain level has improved since her last visit and surgical procedure. She rates the severity of her postoperative left shoulder pain 4 out of 10. She describes her pain as sharp, throbbing, burning, and intermittent. The patient's postoperative left shoulder pain does make it difficult for her to go to sleep and does wake her up from sleep. The patient has been taking Mobic and Norco for her discomfort as needed. She has been working on range of motion, strengthening, and stretching exercises with an at-home exercise program.    Allergies   Allergen Reactions    Sulfa (Sulfonamide Antibiotics) Anaphylaxis    Clindamycin Rash     Pt was also on Medrol, but has had that before w/o issues. She notes was given other drugs in ER visit 1 week prior to rash, but had been on the Clindamycin all week leading up to it.  Other Medication Other (comments)     Pt has tried Lexapro, Sertraline, Effexor, Paxil, and Wellbutrin (anger/irritability) and either had unwanted SEs or did not work for anxiety/depression       Current Outpatient Medications   Medication Sig    ibuprofen (MOTRIN) 800 mg tablet Take 1 Tablet by mouth every eight (8) hours as needed for Pain.  meloxicam (Mobic) 15 mg tablet Take 1 Tablet by mouth daily.  SUMAtriptan (IMITREX) 50 mg tablet May take up to 100 mg x 1 dose as needed for headache. If symptoms persist may repeat one time 2 hours later. Max 200 mg daily.  (Patient not taking: Reported on 12/10/2021)    ondansetron (ZOFRAN ODT) 4 mg disintegrating tablet DISSOLVE 1 TABLET ON TONGUE EVERY 8 HOURS AS NEEDED FOR NAUSEA    ibuprofen (MOTRIN) 800 mg tablet Take 1 Tab by mouth every eight (8) hours as needed for Pain.  butalbital-acetaminophen-caffeine (FIORICET, ESGIC) -40 mg per tablet TAKE 1 TABLET BY MOUTH EVERY 6 HOURS AS NEEDED FOR MIGRAINE    acetaminophen (Tylenol Extra Strength) 500 mg tablet Take 500 mg by mouth every six (6) hours as needed for Pain.  omeprazole (PRILOSEC) 40 mg capsule Take 40 mg by mouth daily.  montelukast (SINGULAIR) 10 mg tablet Take 10 mg by mouth daily.  cetirizine (ZYRTEC) 10 mg tablet Take  by mouth daily as needed for Allergies.  fluticasone propionate (FLONASE ALLERGY RELIEF) 50 mcg/actuation nasal spray 2 Sprays by Both Nostrils route two (2) times daily as needed for Rhinitis. No current facility-administered medications for this visit. Past Medical History:   Diagnosis Date    Abnormal Papanicolaou smear of cervix     ADD (attention deficit disorder)     Depression     Headache     MIGRAINES        Past Surgical History:   Procedure Laterality Date    HX ORTHOPAEDIC  , , ,     SHOULDER SURGERY X 4    HX TUBAL LIGATION  2016       Family History   Adopted: Yes        Social History     Socioeconomic History    Marital status:      Spouse name: Not on file    Number of children: Not on file    Years of education: Not on file    Highest education level: Not on file   Occupational History    Not on file   Tobacco Use    Smoking status: Former Smoker     Types: Cigarettes     Quit date: 2018     Years since quittin.4    Smokeless tobacco: Never Used   Substance and Sexual Activity    Alcohol use:  Yes     Alcohol/week: 4.0 standard drinks     Types: 4 Standard drinks or equivalent per week     Comment: not really    Drug use: No    Sexual activity: Yes     Partners: Male     Birth control/protection: Surgical     Comment: BTL   Other Topics Concern    Not on file   Social History Narrative    Not on file     Social Determinants of Health     Financial Resource Strain:     Difficulty of Paying Living Expenses: Not on file   Food Insecurity:     Worried About Running Out of Food in the Last Year: Not on file    Janelle of Food in the Last Year: Not on file   Transportation Needs:     Lack of Transportation (Medical): Not on file    Lack of Transportation (Non-Medical): Not on file   Physical Activity:     Days of Exercise per Week: Not on file    Minutes of Exercise per Session: Not on file   Stress:     Feeling of Stress : Not on file   Social Connections:     Frequency of Communication with Friends and Family: Not on file    Frequency of Social Gatherings with Friends and Family: Not on file    Attends Pent[employer]ost[employer]ism Services: Not on file    Active Member of [address] [place] [address] [employer] or Organizations: Not on file    Attends Club or Organization Meetings: Not on file    Marital Status: Not on file   Intimate Partner Violence:     Fear of Current or Ex-Partner: Not on file    Emotionally Abused: Not on file    Physically Abused: Not on file    Sexually Abused: Not on file   Housing Stability:     Unable to Pay for Housing in the Last Year: Not on file    Number of [place] in the Last Year: Not on file    Unstable Housing in the Last Year: Not on file       Review of Systems   All other systems reviewed and are negative. Vitals:  Ht 5' 3\" (1.6 m)   Wt 207 lb (93.9 kg)   BMI 36.67 kg/m²    Body mass index is 36.67 kg/m². Ortho Exam     The patient is well-developed and well-nourished. The patient presents today in alert and oriented x3 with a normal mood and affect. The patient stands with a normal weightbearing line and walks with a normal gait. Left shoulder wounds clean and dry neurovascular intact. There is no ecchymosis or erythema.   Her incisions are well-healed with no sign of irritation or infection and look normal.  She does have full elbow and wrist range of motion. Her shoulder range of motion is improving nicely. She has approximately 150 degrees of active forward flexion and 90 degrees of active abduction. She internally rotates her hip pocket. There is still limitation in all planes of motion secondary to her postoperative discomfort which is normal to be expected. Her strength is improving nicely. There is still weakness noted compared to normal.  Her sensations are intact and pulses are 2+. Her skin is well-healed. ASSESSMENT/PLAN:      1. Pain of left shoulder region  -     REFERRAL TO PHYSICAL THERAPY  -     ibuprofen (MOTRIN) 800 mg tablet; Take 1 Tablet by mouth every eight (8) hours as needed for Pain., Normal, Disp-90 Tablet, R-2  2. Status post shoulder surgery       Below is the assessment and plan developed based on review of pertinent history, physical exam, labs, studies, and medications. **The patient was referred to formal physical therapy. **    We discussed the patient's left shoulder arthroscopic exam with extensive debridement and open biceps tenodesis. Her surgery was performed on 6/6/2022. She is now approximately 4-1/2 weeks status post surgical intervention. The patient continues to progress nicely in her recovery. Her pain is intermittent and well controlled. Her range of motion and strength both continue to improve. The patient will continue the postoperative protocol by working on range of motion, strengthening, and stretching exercises at both formal physical therapy and with an at-home exercise program as pain tolerates. She may continue to increase activities as tolerated and as discussed today in the office. She is still to be cautious with any significant lifting with her left upper extremity, power biceps activities, internal and external supination, and reaching behind her with her arm fully extended.   I did encourage her to ice when possible, modify her activity level based on her postoperative left shoulder and arm pain, and use anti-inflammatory medication when necessary. The patient was given a prescription for ibuprofen which she will use as needed and as directed. I will see her back in 3 to 4 weeks for reevaluation and further discussion of the postoperative protocol. Return in about 3 weeks (around 7/28/2022) for Re-evaluation and further discussion of the postoperative call. An electronic signature was used to authenticate this note.   -- Ce Aguirre MD

## 2022-07-12 ENCOUNTER — DOCUMENTATION ONLY (OUTPATIENT)
Dept: ORTHOPEDIC SURGERY | Age: 42
End: 2022-07-12

## 2022-08-11 ENCOUNTER — OFFICE VISIT (OUTPATIENT)
Dept: ORTHOPEDIC SURGERY | Age: 42
End: 2022-08-11
Payer: MEDICAID

## 2022-08-11 VITALS — WEIGHT: 206 LBS | HEIGHT: 63 IN | BODY MASS INDEX: 36.5 KG/M2

## 2022-08-11 DIAGNOSIS — Z98.890 STATUS POST SHOULDER SURGERY: ICD-10-CM

## 2022-08-11 DIAGNOSIS — M25.512 PAIN OF LEFT SHOULDER REGION: Primary | ICD-10-CM

## 2022-08-11 PROCEDURE — 99024 POSTOP FOLLOW-UP VISIT: CPT | Performed by: ORTHOPAEDIC SURGERY

## 2022-08-11 RX ORDER — ONDANSETRON 4 MG/1
TABLET, FILM COATED ORAL
COMMUNITY
Start: 2022-05-23

## 2022-08-11 RX ORDER — ALBUTEROL SULFATE 90 UG/1
AEROSOL, METERED RESPIRATORY (INHALATION)
COMMUNITY
Start: 2022-08-08

## 2022-08-11 RX ORDER — METHYLPHENIDATE HYDROCHLORIDE 20 MG/1
TABLET ORAL
COMMUNITY
Start: 2022-07-18

## 2022-08-11 RX ORDER — ESCITALOPRAM OXALATE 20 MG/1
TABLET ORAL
COMMUNITY
Start: 2022-07-14

## 2022-08-11 NOTE — PROGRESS NOTES
Johnna Alas (: 1980) is a 35 y.o. female, patient, here for evaluation of the following chief complaint(s):  Shoulder Pain (left) and Surgical Follow-up       HPI:    She is now approximately 9 1/2 weeks status post left shoulder arthroscopic exam with extensive debridement and open biceps tenodesis. Her surgery was performed on 2022. She was last seen on 2022. The patient states that her pain level has improved since her last visit and surgical procedure. The patient rates the severity of her postoperative left shoulder pain as a 1 out of 10. The patient describes her pain as aching. The patient has been taking Motrin for her pain as needed. The patient has been attending formal physical therapy postop. Allergies   Allergen Reactions    Sulfa (Sulfonamide Antibiotics) Anaphylaxis    Clindamycin Rash     Pt was also on Medrol, but has had that before w/o issues. She notes was given other drugs in ER visit 1 week prior to rash, but had been on the Clindamycin all week leading up to it. Other Medication Other (comments)     Pt has tried Lexapro, Sertraline, Effexor, Paxil, and Wellbutrin (anger/irritability) and either had unwanted SEs or did not work for anxiety/depression       Current Outpatient Medications   Medication Sig    ProAir HFA 90 mcg/actuation inhaler     escitalopram oxalate (LEXAPRO) 20 mg tablet     methylphenidate HCl (RITALIN) 20 mg tablet     ondansetron hcl (ZOFRAN) 4 mg tablet     meloxicam (Mobic) 15 mg tablet Take 1 Tablet by mouth daily. ondansetron (ZOFRAN ODT) 4 mg disintegrating tablet DISSOLVE 1 TABLET ON TONGUE EVERY 8 HOURS AS NEEDED FOR NAUSEA    ibuprofen (MOTRIN) 800 mg tablet Take 1 Tab by mouth every eight (8) hours as needed for Pain.     butalbital-acetaminophen-caffeine (FIORICET, ESGIC) -40 mg per tablet TAKE 1 TABLET BY MOUTH EVERY 6 HOURS AS NEEDED FOR MIGRAINE    acetaminophen (TYLENOL) 500 mg tablet Take 500 mg by mouth every six (6) hours as needed for Pain. omeprazole (PRILOSEC) 40 mg capsule Take 40 mg by mouth daily. montelukast (SINGULAIR) 10 mg tablet Take 10 mg by mouth daily. cetirizine (ZYRTEC) 10 mg tablet Take  by mouth daily as needed for Allergies. fluticasone propionate (FLONASE) 50 mcg/actuation nasal spray 2 Sprays by Both Nostrils route two (2) times daily as needed for Rhinitis. SUMAtriptan (IMITREX) 50 mg tablet May take up to 100 mg x 1 dose as needed for headache. If symptoms persist may repeat one time 2 hours later. Max 200 mg daily. (Patient not taking: No sig reported)     No current facility-administered medications for this visit. Past Medical History:   Diagnosis Date    Abnormal Papanicolaou smear of cervix     ADD (attention deficit disorder)     Depression     Headache     MIGRAINES        Past Surgical History:   Procedure Laterality Date    HX ORTHOPAEDIC  , , ,     SHOULDER SURGERY X 4    HX TUBAL LIGATION  2016       Family History   Adopted: Yes        Social History     Socioeconomic History    Marital status:      Spouse name: Not on file    Number of children: Not on file    Years of education: Not on file    Highest education level: Not on file   Occupational History    Not on file   Tobacco Use    Smoking status: Former     Types: Cigarettes     Quit date: 2018     Years since quittin.5    Smokeless tobacco: Never   Substance and Sexual Activity    Alcohol use:  Yes     Alcohol/week: 4.0 standard drinks     Types: 4 Standard drinks or equivalent per week     Comment: not really    Drug use: No    Sexual activity: Yes     Partners: Male     Birth control/protection: Surgical     Comment: BTL   Other Topics Concern    Not on file   Social History Narrative    Not on file     Social Determinants of Health     Financial Resource Strain: Not on file   Food Insecurity: Not on file   Transportation Needs: Not on file   Physical Activity: Not on file Stress: Not on file   Social Connections: Not on file   Intimate Partner Violence: Not on file   Housing Stability: Not on file       Review of Systems   All other systems reviewed and are negative. Vitals:  Ht 5' 3\" (1.6 m)   Wt 206 lb (93.4 kg)   BMI 36.49 kg/m²    Body mass index is 36.49 kg/m². Ortho Exam     The patient is well-developed and well-nourished. The patient presents today in alert and oriented x3 with a normal mood and affect. The patient stands with a normal weightbearing line and walks with a normal gait. Left shoulder wounds clean and dry neurovascular intact. There is no ecchymosis or erythema. Her incisions are well-healed with no sign of irritation or infection and look normal.  She does have full elbow and wrist range of motion. Her shoulder range of motion is improving nicely. She has regained full range of motion. Her strength continues to improve nicely. There is still some slight weakness noted compared to normal.  Her sensations are intact and pulses are 2+. Her skin is well-healed. ASSESSMENT/PLAN:      1. Pain of left shoulder region  2. Status post shoulder surgery     Below is the assessment and plan developed based on review of pertinent history, physical exam, labs, studies, and medications. **The patient was referred to formal physical therapy. **     We discussed the patient's left shoulder arthroscopic exam with extensive debridement and open biceps tenodesis. Her surgery was performed on 6/6/2022. She is now approximately 9 1/2 weeks status post surgical intervention. The patient continues to progress nicely in her recovery. Her pain is intermittent and well controlled. Her range of motion and strength both continue to improve. The patient will continue the postoperative protocol by working on range of motion, strengthening, and stretching exercises at both formal physical therapy and with an at-home exercise program as pain tolerates.   She may continue to increase activities as tolerated and as discussed today in the office. She essentially no restrictions. I did encourage her to ice when possible, modify her activity level based on her postoperative left shoulder and arm pain, and use anti-inflammatory medication when necessary. The patient was given a prescription for ibuprofen which she will use as needed and as directed. I will see her back in 3 to 4 weeks for reevaluation and further discussion of the postoperative protocol. Return in about 4 weeks (around 9/8/2022) for Re-evaluation and further discussion of the postop protocol. An electronic signature was used to authenticate this note.   -- Josiah Gonzalez MD

## 2022-08-11 NOTE — PROGRESS NOTES
Identified pt with two pt identifiers(name and ). Reviewed record in preparation for visit and have obtained necessary documentation. All patient medications has been reviewed. No chief complaint on file. 3 most recent PHQ Screens 12/10/2021   PHQ Not Done -   Little interest or pleasure in doing things Not at all   Feeling down, depressed, irritable, or hopeless Not at all   Total Score PHQ 2 0   Trouble falling or staying asleep, or sleeping too much -   Feeling tired or having little energy -   Poor appetite, weight loss, or overeating -   Feeling bad about yourself - or that you are a failure or have let yourself or your family down -   Trouble concentrating on things such as school, work, reading, or watching TV -   Moving or speaking so slowly that other people could have noticed; or the opposite being so fidgety that others notice -   Thoughts of being better off dead, or hurting yourself in some way -   PHQ 9 Score -   How difficult have these problems made it for you to do your work, take care of your home and get along with others -     Abuse Screening Questionnaire 2021   Do you ever feel afraid of your partner? N   Are you in a relationship with someone who physically or mentally threatens you? N   Is it safe for you to go home? Y       Health Maintenance Due   Topic    COVID-19 Vaccine (1)     Health Maintenance Review: Patient reminded of \"due or due soon\" health maintenance. I have asked the patient to contact his/her primary care provider (PCP) for follow-up on his/her health maintenance. There were no vitals filed for this visit.     Wt Readings from Last 3 Encounters:   22 207 lb (93.9 kg)   22 207 lb (93.9 kg)   22 210 lb (95.3 kg)     Temp Readings from Last 3 Encounters:   21 99 °F (37.2 °C) (Oral)   21 97.9 °F (36.6 °C) (Oral)   20 97.6 °F (36.4 °C) (Oral)     BP Readings from Last 3 Encounters:   21 120/66   21 120/68   20 113/75     Pulse Readings from Last 3 Encounters:   04/27/21 66   01/06/21 71   01/14/20 74       1. \"Have you been to the ER, urgent care clinic since your last visit? Hospitalized since your last visit? \" No    2. \"Have you seen or consulted any other health care providers outside of the [address] [place] [address] since your last visit? \" No

## 2022-12-01 ENCOUNTER — NURSE TRIAGE (OUTPATIENT)
Dept: OTHER | Facility: CLINIC | Age: 42
End: 2022-12-01

## 2022-12-01 NOTE — TELEPHONE ENCOUNTER
Location of [place]: [place]    Received call from ALE Louie at [facility] with Red Flag Complaint. Subjective: Caller states \"ER follow up for coughing. Swelling around neck and neck pain since ED. I went to ED and was exposed to the flu. \"     Current Symptoms: Left side of neck swelling gumball, cough, body aches, sore throat. Onset: 1 day ago; sudden    Associated Symptoms: NA    Pain Severity: 8/10; pain and throbbing; constant    Temperature: patient states fever is now gone by unknown method    What has been tried: Steroid - 2 doses of that. dayquil    LMP:  11/6/2022  Pregnant: No    Recommended disposition: Go to [facility]    Care advice provided, patient verbalizes understanding; denies any other questions or concerns; instructed to call back for any new or worsening symptoms. Patient/Caller agrees with recommended disposition; writer provided warm transfer to [employer] at [facility] for appointment scheduling    Attention Provider: Thank you for allowing me to participate in the care of your patient. The patient was connected to triage in response to information provided to the [facility]. Please do not respond through this encounter as the response is not directed to a shared pool.       Reason for Disposition   Single large node and size > 1 inch (2.5 cm)    Protocols used: Lymph Nodes - Swollen-ADULT-[place]

## 2023-10-23 SDOH — ECONOMIC STABILITY: FOOD INSECURITY: WITHIN THE PAST 12 MONTHS, YOU WORRIED THAT YOUR FOOD WOULD RUN OUT BEFORE YOU GOT MONEY TO BUY MORE.: NEVER TRUE

## 2023-10-23 SDOH — ECONOMIC STABILITY: INCOME INSECURITY: HOW HARD IS IT FOR YOU TO PAY FOR THE VERY BASICS LIKE FOOD, HOUSING, MEDICAL CARE, AND HEATING?: SOMEWHAT HARD

## 2023-10-23 SDOH — ECONOMIC STABILITY: HOUSING INSECURITY
IN THE LAST 12 MONTHS, WAS THERE A TIME WHEN YOU DID NOT HAVE A STEADY PLACE TO SLEEP OR SLEPT IN A SHELTER (INCLUDING NOW)?: NO

## 2023-10-23 SDOH — ECONOMIC STABILITY: FOOD INSECURITY: WITHIN THE PAST 12 MONTHS, THE FOOD YOU BOUGHT JUST DIDN'T LAST AND YOU DIDN'T HAVE MONEY TO GET MORE.: SOMETIMES TRUE

## 2023-10-23 SDOH — ECONOMIC STABILITY: TRANSPORTATION INSECURITY
IN THE PAST 12 MONTHS, HAS LACK OF TRANSPORTATION KEPT YOU FROM MEETINGS, WORK, OR FROM GETTING THINGS NEEDED FOR DAILY LIVING?: NO

## 2023-10-24 ENCOUNTER — OFFICE VISIT (OUTPATIENT)
Dept: FAMILY MEDICINE CLINIC | Age: 43
End: 2023-10-24
Payer: MEDICAID

## 2023-10-24 VITALS
HEIGHT: 63 IN | HEART RATE: 76 BPM | DIASTOLIC BLOOD PRESSURE: 80 MMHG | RESPIRATION RATE: 18 BRPM | OXYGEN SATURATION: 98 % | SYSTOLIC BLOOD PRESSURE: 133 MMHG | WEIGHT: 206 LBS | BODY MASS INDEX: 36.5 KG/M2

## 2023-10-24 DIAGNOSIS — R93.7 ABNORMAL MAGNETIC RESONANCE IMAGING OF BONE: Primary | ICD-10-CM

## 2023-10-24 PROCEDURE — 99213 OFFICE O/P EST LOW 20 MIN: CPT | Performed by: PHYSICIAN ASSISTANT

## 2023-10-24 ASSESSMENT — COLUMBIA-SUICIDE SEVERITY RATING SCALE - C-SSRS
3. HAVE YOU BEEN THINKING ABOUT HOW YOU MIGHT KILL YOURSELF?: NO
7. DID THIS OCCUR IN THE LAST THREE MONTHS: NO
5. HAVE YOU STARTED TO WORK OUT OR WORKED OUT THE DETAILS OF HOW TO KILL YOURSELF? DO YOU INTEND TO CARRY OUT THIS PLAN?: NO
4. HAVE YOU HAD THESE THOUGHTS AND HAD SOME INTENTION OF ACTING ON THEM?: NO

## 2023-10-24 ASSESSMENT — PATIENT HEALTH QUESTIONNAIRE - PHQ9
9. THOUGHTS THAT YOU WOULD BE BETTER OFF DEAD, OR OF HURTING YOURSELF: 0
4. FEELING TIRED OR HAVING LITTLE ENERGY: 0
1. LITTLE INTEREST OR PLEASURE IN DOING THINGS: 0
2. FEELING DOWN, DEPRESSED OR HOPELESS: 0
SUM OF ALL RESPONSES TO PHQ QUESTIONS 1-9: 0
SUM OF ALL RESPONSES TO PHQ9 QUESTIONS 1 & 2: 0
SUM OF ALL RESPONSES TO PHQ QUESTIONS 1-9: 0
6. FEELING BAD ABOUT YOURSELF - OR THAT YOU ARE A FAILURE OR HAVE LET YOURSELF OR YOUR FAMILY DOWN: 0
SUM OF ALL RESPONSES TO PHQ QUESTIONS 1-9: 0
8. MOVING OR SPEAKING SO SLOWLY THAT OTHER PEOPLE COULD HAVE NOTICED. OR THE OPPOSITE, BEING SO FIGETY OR RESTLESS THAT YOU HAVE BEEN MOVING AROUND A LOT MORE THAN USUAL: 0
5. POOR APPETITE OR OVEREATING: 0
10. IF YOU CHECKED OFF ANY PROBLEMS, HOW DIFFICULT HAVE THESE PROBLEMS MADE IT FOR YOU TO DO YOUR WORK, TAKE CARE OF THINGS AT HOME, OR GET ALONG WITH OTHER PEOPLE: 0
3. TROUBLE FALLING OR STAYING ASLEEP: 0
SUM OF ALL RESPONSES TO PHQ QUESTIONS 1-9: 0
7. TROUBLE CONCENTRATING ON THINGS, SUCH AS READING THE NEWSPAPER OR WATCHING TELEVISION: 0

## 2023-10-24 NOTE — PROGRESS NOTES
tablet by mouth every 6 hours as needed      albuterol sulfate HFA (PROAIR HFA) 108 (90 Base) MCG/ACT inhaler ceived the following from [facility] - [facility]: Outside name: ProAir HFA 90 mcg/actuation inhaler      cetirizine (ZYRTEC) 10 MG tablet Take by mouth daily as needed      escitalopram (LEXAPRO) 20 MG tablet ceived the following from [address]  - [facility]: Outside name: escitalopram oxalate (LEXAPRO) 20 mg tablet      fluticasone (FLONASE) 50 MCG/ACT nasal spray 2 sprays by Nasal route 2 times daily as needed      ibuprofen (ADVIL;MOTRIN) 800 MG tablet Take 1 tablet by mouth every 8 hours as needed      montelukast (SINGULAIR) 10 MG tablet Take 1 tablet by mouth daily      omeprazole (PRILOSEC) 40 MG delayed release capsule Take 1 capsule by mouth daily      ondansetron (ZOFRAN-ODT) 4 MG disintegrating tablet DISSOLVE 1 TABLET ON TONGUE EVERY 8 HOURS AS NEEDED FOR NAUSEA       No current facility-administered medications for this visit. Social History     Socioeconomic History    Marital status:      Spouse name: None    Number of children: None    Years of education: None    Highest education level: None   Tobacco Use    Smoking status: Former     Packs/day: 0.50     Years: 5.00     Additional pack years: 0.00     Total pack years: 2.50     Types: Cigarettes     Quit date: 2018     Years since quittin.7     Passive exposure: Past    Smokeless tobacco: Never   Substance and Sexual Activity    Alcohol use:  Yes     Alcohol/week: 4.0 standard drinks of alcohol    Drug use: No    Sexual activity: Yes     Partners: Male     Birth control/protection: Surgical     Comment: BTL     Social Determinants of Health     Financial Resource Strain: Medium Risk (10/23/2023)    Overall Financial Resource Strain (CARDIA)     Difficulty of Paying Living Expenses: Somewhat hard   Food Insecurity: Food Insecurity Present (10/23/2023)    Hunger Vital Sign     Worried About Running Out of Food in

## 2023-10-26 LAB
ALBUMIN SERPL-MCNC: 4.1 G/DL (ref 3.9–4.9)
ALBUMIN/GLOB SERPL: 1.3 {RATIO} (ref 1.2–2.2)
ALP SERPL-CCNC: 88 IU/L (ref 44–121)
ALT SERPL-CCNC: 9 IU/L (ref 0–32)
AST SERPL-CCNC: 16 IU/L (ref 0–40)
BILIRUB SERPL-MCNC: 0.2 MG/DL (ref 0–1.2)
BUN SERPL-MCNC: 10 MG/DL (ref 6–24)
BUN/CREAT SERPL: 18 (ref 9–23)
CALCIUM SERPL-MCNC: 9.1 MG/DL (ref 8.7–10.2)
CHLORIDE SERPL-SCNC: 101 MMOL/L (ref 96–106)
CO2 SERPL-SCNC: 27 MMOL/L (ref 20–29)
CREAT SERPL-MCNC: 0.56 MG/DL (ref 0.57–1)
EGFRCR SERPLBLD CKD-EPI 2021: 115 ML/MIN/1.73
GLOBULIN SER CALC-MCNC: 3.2 G/DL (ref 1.5–4.5)
GLUCOSE SERPL-MCNC: 100 MG/DL (ref 70–99)
POTASSIUM SERPL-SCNC: 3.6 MMOL/L (ref 3.5–5.2)
PROT SERPL-MCNC: 7.3 G/DL (ref 6–8.5)
SODIUM SERPL-SCNC: 140 MMOL/L (ref 134–144)

## 2023-10-27 LAB
BASOPHILS # BLD AUTO: 0.1 X10E3/UL (ref 0–0.2)
BASOPHILS NFR BLD AUTO: 1 %
EOSINOPHIL # BLD AUTO: 0.1 X10E3/UL (ref 0–0.4)
EOSINOPHIL NFR BLD AUTO: 1 %
ERYTHROCYTE [DISTWIDTH] IN BLOOD BY AUTOMATED COUNT: 12.9 % (ref 11.7–15.4)
HCT VFR BLD AUTO: 36 % (ref 34–46.6)
HGB BLD-MCNC: 11.8 G/DL (ref 11.1–15.9)
IMM GRANULOCYTES # BLD AUTO: 0 X10E3/UL (ref 0–0.1)
IMM GRANULOCYTES NFR BLD AUTO: 0 %
LYMPHOCYTES # BLD AUTO: 2.2 X10E3/UL (ref 0.7–3.1)
LYMPHOCYTES NFR BLD AUTO: 26 %
MCH RBC QN AUTO: 29.8 PG (ref 26.6–33)
MCHC RBC AUTO-ENTMCNC: 32.8 G/DL (ref 31.5–35.7)
MCV RBC AUTO: 91 FL (ref 79–97)
MONOCYTES # BLD AUTO: 0.5 X10E3/UL (ref 0.1–0.9)
MONOCYTES NFR BLD AUTO: 6 %
NEUTROPHILS # BLD AUTO: 5.6 X10E3/UL (ref 1.4–7)
NEUTROPHILS NFR BLD AUTO: 66 %
PATH INTERP BLD-IMP: NORMAL
PATH REV BLD -IMP: NORMAL
PATHOLOGIST NAME: NORMAL
PLATELET # BLD AUTO: 298 X10E3/UL (ref 150–450)
RBC # BLD AUTO: 3.96 X10E6/UL (ref 3.77–5.28)
WBC # BLD AUTO: 8.4 X10E3/UL (ref 3.4–10.8)

## 2023-11-09 ENCOUNTER — OFFICE VISIT (OUTPATIENT)
Dept: FAMILY MEDICINE CLINIC | Age: 43
End: 2023-11-09
Payer: MEDICAID

## 2023-11-09 VITALS
RESPIRATION RATE: 14 BRPM | DIASTOLIC BLOOD PRESSURE: 81 MMHG | TEMPERATURE: 98.1 F | SYSTOLIC BLOOD PRESSURE: 133 MMHG | OXYGEN SATURATION: 96 % | HEIGHT: 63 IN | HEART RATE: 61 BPM | BODY MASS INDEX: 36.11 KG/M2 | WEIGHT: 203.8 LBS

## 2023-11-09 DIAGNOSIS — Z86.39 H/O IRON DEFICIENCY: ICD-10-CM

## 2023-11-09 DIAGNOSIS — Z13.220 LIPID SCREENING: ICD-10-CM

## 2023-11-09 DIAGNOSIS — Z00.01 ENCOUNTER FOR WELL ADULT EXAM WITH ABNORMAL FINDINGS: Primary | ICD-10-CM

## 2023-11-09 DIAGNOSIS — Z11.4 SCREENING FOR HUMAN IMMUNODEFICIENCY VIRUS: ICD-10-CM

## 2023-11-09 DIAGNOSIS — Z86.39 H/O VITAMIN D DEFICIENCY: ICD-10-CM

## 2023-11-09 DIAGNOSIS — D17.21 LIPOMA OF RIGHT FOREARM: ICD-10-CM

## 2023-11-09 PROCEDURE — 99396 PREV VISIT EST AGE 40-64: CPT | Performed by: PHYSICIAN ASSISTANT

## 2023-11-09 PROCEDURE — 36415 COLL VENOUS BLD VENIPUNCTURE: CPT | Performed by: PHYSICIAN ASSISTANT

## 2023-11-11 LAB
25(OH)D3+25(OH)D2 SERPL-MCNC: 18.6 NG/ML (ref 30–100)
CHOLEST SERPL-MCNC: 169 MG/DL (ref 100–199)
HDLC SERPL-MCNC: 69 MG/DL
HIV 1+2 AB+HIV1 P24 AG SERPL QL IA: NON REACTIVE
IMP & REVIEW OF LAB RESULTS: NORMAL
IRON SATN MFR SERPL: 11 % (ref 15–55)
IRON SERPL-MCNC: 50 UG/DL (ref 27–159)
LDLC SERPL CALC-MCNC: 85 MG/DL (ref 0–99)
TIBC SERPL-MCNC: 443 UG/DL (ref 250–450)
TRIGL SERPL-MCNC: 83 MG/DL (ref 0–149)
UIBC SERPL-MCNC: 393 UG/DL (ref 131–425)
VLDLC SERPL CALC-MCNC: 15 MG/DL (ref 5–40)

## 2023-11-14 LAB
IMP & REVIEW OF LAB RESULTS: NORMAL
SPECIMEN STATUS REPORT: NORMAL